# Patient Record
Sex: FEMALE | Race: WHITE | NOT HISPANIC OR LATINO | Employment: OTHER | URBAN - METROPOLITAN AREA
[De-identification: names, ages, dates, MRNs, and addresses within clinical notes are randomized per-mention and may not be internally consistent; named-entity substitution may affect disease eponyms.]

---

## 2017-04-26 ENCOUNTER — HOSPITAL ENCOUNTER (EMERGENCY)
Facility: HOSPITAL | Age: 37
Discharge: HOME | End: 2017-04-26
Attending: EMERGENCY MEDICINE

## 2017-04-26 VITALS
RESPIRATION RATE: 19 BRPM | OXYGEN SATURATION: 97 % | HEART RATE: 67 BPM | SYSTOLIC BLOOD PRESSURE: 120 MMHG | WEIGHT: 165 LBS | DIASTOLIC BLOOD PRESSURE: 71 MMHG | TEMPERATURE: 98.3 F

## 2017-04-26 DIAGNOSIS — N89.8 VAGINAL DISCHARGE: Primary | ICD-10-CM

## 2017-04-26 DIAGNOSIS — R30.0 DYSURIA: ICD-10-CM

## 2017-04-26 LAB
BACTERIA UR QL AUTO: ABNORMAL /HPF
BILIRUB UR QL STRIP: NEGATIVE
CHLAMYDIA DNA CVX QL NAA+PROBE: NORMAL
CLARITY UR: CLEAR
COLOR UR: YELLOW
COLOR, POC: NORMAL
GLUCOSE UR STRIP-MCNC: NEGATIVE MG/DL
HGB UR QL STRIP.AUTO: NEGATIVE
HYALINE CASTS #/AREA URNS LPF: ABNORMAL /LPF
KETONES UR STRIP-MCNC: NEGATIVE MG/DL
LEUKOCYTE ESTERASE UR QL STRIP: ABNORMAL
N GONORRHOEA DNA GENITAL QL NAA+PROBE: NORMAL
NITRITE UR QL STRIP: NEGATIVE
NON-SQ EPI CELLS URNS QL MICRO: ABNORMAL /HPF
PH UR STRIP.AUTO: 8.5 [PH] (ref 4.5–8)
PROT UR STRIP-MCNC: ABNORMAL MG/DL
RBC #/AREA URNS AUTO: ABNORMAL /HPF
SP GR UR STRIP.AUTO: 1.01 (ref 1–1.03)
UROBILINOGEN UR QL STRIP.AUTO: 0.2 E.U./DL
WBC #/AREA URNS AUTO: ABNORMAL /HPF

## 2017-04-26 PROCEDURE — 81001 URINALYSIS AUTO W/SCOPE: CPT

## 2017-04-26 PROCEDURE — 87186 SC STD MICRODIL/AGAR DIL: CPT

## 2017-04-26 PROCEDURE — 81025 URINE PREGNANCY TEST: CPT | Performed by: EMERGENCY MEDICINE

## 2017-04-26 PROCEDURE — 81002 URINALYSIS NONAUTO W/O SCOPE: CPT | Performed by: EMERGENCY MEDICINE

## 2017-04-26 PROCEDURE — 87491 CHLMYD TRACH DNA AMP PROBE: CPT | Performed by: EMERGENCY MEDICINE

## 2017-04-26 PROCEDURE — 96372 THER/PROPH/DIAG INJ SC/IM: CPT

## 2017-04-26 PROCEDURE — 87086 URINE CULTURE/COLONY COUNT: CPT

## 2017-04-26 PROCEDURE — 99283 EMERGENCY DEPT VISIT LOW MDM: CPT

## 2017-04-26 PROCEDURE — 87591 N.GONORRHOEAE DNA AMP PROB: CPT | Performed by: EMERGENCY MEDICINE

## 2017-04-26 PROCEDURE — 87147 CULTURE TYPE IMMUNOLOGIC: CPT

## 2017-04-26 RX ORDER — AZITHROMYCIN 250 MG/1
1000 TABLET, FILM COATED ORAL ONCE
Status: COMPLETED | OUTPATIENT
Start: 2017-04-26 | End: 2017-04-26

## 2017-04-26 RX ORDER — METRONIDAZOLE 500 MG/1
500 TABLET ORAL 3 TIMES DAILY
Qty: 21 TABLET | Refills: 0 | Status: SHIPPED | OUTPATIENT
Start: 2017-04-26 | End: 2017-05-03

## 2017-04-26 RX ORDER — FLUCONAZOLE 150 MG/1
150 TABLET ORAL DAILY
Qty: 1 TABLET | Refills: 0 | Status: SHIPPED | OUTPATIENT
Start: 2017-04-26 | End: 2017-04-27

## 2017-04-26 RX ADMIN — AZITHROMYCIN 1000 MG: 250 TABLET, FILM COATED ORAL at 09:20

## 2017-04-26 RX ADMIN — WATER 250 MG: 1 INJECTION INTRAMUSCULAR; INTRAVENOUS; SUBCUTANEOUS at 09:20

## 2017-04-28 LAB
BACTERIA UR CULT: NORMAL
BACTERIA UR CULT: NORMAL

## 2018-01-10 NOTE — RESULT NOTES
Verified Results  TEST IN QUESTION - CYTOLOGY [id number] 12:00AM Elina Hudson     Test Name Result Flag Reference   STATUS FINAL     CONTAINER TYPE: THIN PREP     QUESTION/PROBLEM NON QUEST REQ     FINAL RESOLUTION PROCESS [id number]

## 2018-01-15 NOTE — RESULT NOTES
Message   can you please let pt know that her urine culture grew bacteria, however I would like to switch her Abx to bactrim as Notrofurantoin, the Abx she was started on was not mentioned in the sensitivities      thank you     Verified Results  (1) GENITAL COMPREHENSIVE CULTURE [id number] 12:00AM Tano Kumar     Test Name Result Flag Reference   CULTURE, GENITAL      CULTURE, GENITAL         MICRO NUMBER:      [id number]    TEST STATUS:       FINAL    SPECIMEN SOURCE:   NOT GIVEN    SPECIMEN QUALITY:  ADEQUATE    RESULT:            Heavy growth of Escherichia coli                            E coli                            ----------------                            INT   GALILEO     AMOX/CLAVULANATE       S     <=2 0     AMPICILLIN             S     <=2 0     AMP/SULBACTAM          S     <=2 0     CEFAZOLIN              NR    <=4 0     CEFEPIME               S     <=1 0     CEFTRIAXONE            S     <=1 0     CIPROFLOXACIN          S     <=0 25     GENTAMICIN             S     <=1 0     IMIPENEM               S     <=0 25     LEVOFLOXACIN           S     <=0 12     PIP/TAZOBACTAM         S     <=4 0     TOBRAMYCIN             S     <=1 0     TRIMETHOPRIM/SULFA     S     <=20 0  S=Susceptible  I=Intermediate  R=Resistant  * = Not Tested  NR = Not Reported  **NN = See Therapy Comments     (1) URINALYSIS w URINE C/S REFLEX (will reflex a microscopy if leukocytes, occult blood, or nitrites are not within normal limits) [id number] 12:00AM Elina Hudson     Test Name Result Flag Reference   COLOR YELLOW  YELLOW   APPEARANCE CLEAR  CLEAR   SPECIFIC GRAVITY 1 015  1 001-1 035   PH 7 0  5 0-8 0   GLUCOSE NEGATIVE  NEGATIVE   BILIRUBIN NEGATIVE  NEGATIVE   RBC 0-2 /HPF  < OR = 2   SQUAMOUS EPITHELIAL CELLS 0-5 /HPF  < OR = 5   BACTERIA MANY /HPF A NONE SEEN   HYALINE CAST NONE SEEN /LPF  NONE SEEN   REFLEXIVE URINE CULTURE      CULTURE INDICATED - RESULTS TO FOLLOW   KETONES NEGATIVE  NEGATIVE   OCCULT BLOOD 2+ A NEGATIVE   PROTEIN 1+ A NEGATIVE   NITRITE POSITIVE A NEGATIVE   LEUKOCYTE ESTERASE 3+ A NEGATIVE   WBC > OR = 60 /HPF A < OR = 5     REFLEXIVE URINE CULTURE [id number] 12:00AM Elina Hudson     Test Name Result Flag Reference   CULTURE, URINE, ROUTINE      CULTURE, URINE, ROUTINE         MICRO NUMBER:      [id number]    TEST STATUS:       FINAL    SPECIMEN SOURCE:   URINE    SPECIMEN QUALITY:  INADEQUATE    RESULT:            Test not performed  Improper specimen submitted  Please submit specimens for urine culture in a                       gray top urine transport tube

## 2018-01-15 NOTE — RESULT NOTES
Verified Results  Urine Dip Non-Automated- POC [id number] 07:29AM Elina Hudson     Test Name Result Flag Reference   Color Yellow     Clarity Cloudy     Leukocytes 500+++     Nitrite +     Blood ++     Bilirubin negative     Urobilinogen 0 2     Protein 15+-     Ph 6 0     Specific Gravity 1 010     Ketone negative     Glucose negative     Color Yellow     Clarity Cloudy     Leukocytes 500+++     Nitrite +     Blood ++     Bilirubin negative     Urobilinogen 0 2     Protein 15+-     Ph 6 0     Specific Gravity 1 010     Ketone negative     Glucose negative               Plan  UTI symptoms    · (1) URINALYSIS w URINE C/S REFLEX (will reflex a microscopy if leukocytes, occult  blood, or nitrites are not within normal limits); Status:Active;  Requested for:[id number];

## 2018-01-16 NOTE — RESULT NOTES
Message   Can you please let patient know that her Pap was normal?   Thank you     Verified Results  THINPREP TIS AND HPV mRNA E6/E7 [id number] 12:00AM Linamelia Elina     Test Name Result Flag Reference   CLINICAL INFORMATION:      NONE GIVEN   LMP:      MID Stuart Holts  PAP:      NONE GIVEN   PREV  BX:      NONE GIVEN   SOURCE:      ENDOCERVIX   STATEMENT OF ADEQUACY:      Satisfactory for evaluation  Endocervical/transformation zone component  present  INTERPRETATION/RESULT:      Negative for intraepithelial lesion or malignancy  COMMENT:      This Pap test has been evaluated with computer  assisted technology  CYTOTECHNOLOGIST:      DDM, CT(ASCP)  CT screening location: [address] [place] [address]   HPV mRNA E6/E7 Not Detected  Not Detected   This test was performed using the APTIMA HPV Assay ([employer] )  This assay detects E6/E7 viral messenger RNA (mRNA) from 14  high-risk HPV types (16,18,31,33,35,39,45,51,52,56,58,59,66,68)

## 2018-01-18 NOTE — MISCELLANEOUS
Message   Recorded as Task   Date: 08/09/2016 09:09 AM, Created By: Wero Lane   Task Name: Follow Up   Assigned To: Gabriella Back   Regarding Patient: Rhea Gutierrez, Status: Active   Comment:    Wero Lane - 09 Aug 2016 9:09 AM     TASK CREATED  Caller: Self; General Medical Question; [phone] (Home)  I gave patient her results of her urine and told her there was another RX called in and she has more questions about the results if you can please call her at [phone]  Candy Belcher - 09 Aug 2016 10:50 AM     TASK EDITED   Gabriella Back - 09 Aug 2016 11:57 AM     TASK REPLIED TO: Previously Assigned To Arnold Nuñez to patient regarding urine culture results  Bactrim has been called into patient's pharmacy, [facility]   She will also take Diflucan after completing anabiotic as she does get recent infections          Signatures   Electronically signed by : Dl Vang MD; Aug  9 2016 11:57AM EST                       (Author)

## 2018-01-18 NOTE — MISCELLANEOUS
Message   Recorded as Task   Date: 08/04/2016 09:39 AM, Created By: Jackie Oro   Task Name: Follow Up   Assigned To: Aleida Mendosa   Regarding Patient: Abel Victor, Status: In Progress   Comment:    Maria Del Rosario Moraes - 04 Aug 2016 9:39 AM     TASK CREATED  Caller: Self; General Medical Question; [phone] (Home)  PT CALLED IN STATING SHE IS STILL NOT FEELING BETTER AND WOULD LIKE DR HUDSON TO PLEASE CALL HER SOMETIME TODAY AT ABOVE NUMBER     Elina Hudson - 04 Aug 2016 11:58 AM     TASK REPLIED TO: Previously Assigned To Elina Hudson LM on pt's VM   Elina Hudson - 04 Aug 2016 11:58 AM     TASK IN PROGRESS   Elina Hudson - 04 Aug 2016 1:07 PM     TASK REPLIED TO: Previously Assigned To Aleida Mendosa  spoke to pt, she will come in on august 5th at 7 30 for UTI symptoms        Signatures   Electronically signed by : Trey Alberto MD; Aug  4 2016  1:07PM EST                       (Author)

## 2018-01-18 NOTE — PROGRESS NOTES
Assessment    1  UTI symptoms (788 99) (R39 9)   2  Vaginal discharge (623 5) (N89 8)   3  Vaginal Pap smear (V76 47) (Z12 72)    Plan   UTI symptoms    · Nitrofurantoin Monohyd Macro 100 MG Oral Capsule; Take one capsule twice daily  for 5 days   · Drink at least 6 glasses of clear liquids a day ; Status:Complete;   Done: 51CUN6514   · Drink plenty of fluids ; Status:Complete;   Done: 57DGQ4735  Vaginal discharge    · (1) GENITAL COMPREHENSIVE CULTURE; Source:Endocervical; Status:Active; Requested for:58Hxc9915;    · 1 - Kathleen Petite DO, [facility]  Obstetrics/Gynecology Physician Referral  Consult  Status: Hold  For - Scheduling  Requested for: 03VKC9723  Care Summary provided  : Yes   ·  [address] MD, Ashanti Dockery Obstetrics/Gynecology Physician Referral  Consult  Status:  Hold For - Scheduling  Requested for: 14PEK8326  Care Summary provided  : Yes  Vaginal Pap smear    · (1) THIN PREP PAP WITH IMAGING; Status:Active; Requested for:07Epj7396;   Maturation index required? : No  : mid July  HPV? : Regardless of Interpretation    Urine Dip Non-Automated- POC; Status:Resulted - Requires Verification,Retrospective By Protocol Authorization;   Done: 05OHR4201 12:00AM  Due:87Ser2080; Last Updated By:Lauryn Gonsalves; 2016 8:22:37 AM;Ordered; For:UTI symptoms; Ordered By:Elina Hudson;   (1) URINALYSIS w URINE C/S REFLEX (will reflex a microscopy if leukocytes, occult blood, or nitrites are not within normal limits); Status:Active - Retrospective By Protocol Authorization; Requested for:16Ssq7343;   Perform:[facility]; [id number]; Last Updated By:Lauryn Gonsalves; 2016 8:22:40 AM;Ordered; For:UTI symptoms; Ordered By:Elina Hudson; Discussion/Summary    50-year-old female presents with a four-day history of urinary urgency, frequency, lower abdominal pressure  Denies fevers, chills, flank pain, blood in the urine, constipation or change in bowel habits   Patient does admit that she does have a vaginal discharge that is noted to be white and thick  Denies any odor to the discharge  POC urine dip positive for leukocytes and microscopic blood  Will send urine for urinalysis and reflex of culture  Will start patient on nitrofurantoin  Re  vaginal discharge, will send Rx for Diflucan and send gentle culture  Routine pap and pelvic Performed today with co-testing for hpv and cytology  Will pt will results  I have also provided patient with referral to gynecology as she has had prior abnormal Paps and I did see some erythema noted at the 12:00 area of the cervix during exam   Patient is agreeable with this   care guide for urinary tract infection provided  return parameters discussed  The patient was counseled regarding diagnostic results, instructions for management, risk factor reductions, prognosis, patient and family education, impressions, risks and benefits of treatment options, importance of compliance with treatment  Possible side effects of new medications were reviewed with the patient/guardian today  The treatment plan was reviewed with the patient/guardian  The patient/guardian understands and agrees with the treatment plan      Chief Complaint  Patient is here c/o urinary frequency, lower abdominal pain upon emptying her bladder x's 4+ days  All medications were reviewed and updated with the patient  History of Present Illness  HPI: 50-year-old female presents with a four-day history of urinary urgency, frequency, lower abdominal pressure  Denies fevers, chills, flank pain, blood in the urine, constipation or change in bowel habits  Patient does admit that she does have a vaginal discharge that is noted to be white and thick  Denies any odor to the discharge  Pt states she had sexual intercourse on Sunday and symptoms started on Monday   Last pelvic exam done by [facility]  Has a history of HPV and has had colposcopies   Vaginitis (Brief):  Symptoms:  vaginal discharge, but no vaginal itching and no vaginal odor  Associated symptoms:  dysuria, urinary frequency and urinary urgency  Review of Systems    Constitutional: no fever and no chills  Gastrointestinal: no abdominal pain and no constipation  Genitourinary: dysuria and vaginal discharge, but as noted in HPI  Integumentary: no itching  Active Problems    1  Bipolar disorder (296 80) (F31 9)   2  Cold sore (054 9) (B00 1)   3  Fatigue (780 79) (R53 83)   4  Genital herpes simplex (054 10) (A60 00)   5  Left ankle injury (959 7) (M98 707H)   6  Right hip pain (719 45) (M25 551)   7  Screening for lipoid disorders (V77 91) (Z13 220)    Past Medical History    1  History of Bipolar disorder (296 80) (F31 9)    Family History  Father    1  Family history of hyperlipidemia (V18 19) (Z83 49)  Maternal Grandfather    2  Family history of Diabetes Mellitus (V18 0)  Paternal Grandfather    3  FH: heart attack (V17 3) (Z82 49)   4  Family history of Stroke Syndrome (V17 1)  Maternal Aunt    5  Family history of malignant neoplasm of breast (V16 3) (Z80 3)  Family History    6  Family history of Reported Previous Mental Illness    Social History    · Being A Social Drinker   · Caffeine use (V49 89) (F15 90)   · tea   · Former smoker (G67 35) (S46 437)   · social smoker, quit 2009    Surgical History    1  History of Tubal Ligation    Current Meds   1  Curcumax Pro Oral Tablet; curcumin; Therapy: 80ZLW8399 to Recorded   2  Probiotic Oral Capsule; Therapy: 68GLX7273 to Recorded   3  ValACYclovir HCl - 500 MG Oral Tablet; TAKE 1 TABLET TWICE DAILY; Therapy: 87EOR3223 to (Evaluate:89Egh5393) Recorded    Allergies    1  Penicillin V Potassium TABS   2   Penicillins    Vitals   Recorded: 21OLY1536 21:60BC   Systolic 696   Diastolic 78   Heart Rate 84   Respiration 14   Temperature 98 5 F   Height 5 ft 1 5 in   Weight 154 lb 4 00 oz   BMI Calculated 28 67   BSA Calculated 1 7     Physical Exam    Constitutional   General appearance: No acute distress, well appearing and well nourished  Ears, Nose, Mouth, and Throat   Lips, teeth, and gums: Normal, good dentition  Oropharynx: Normal with no erythema, edema, exudate or lesions  Neck   Neck: Supple, symmetric, trachea midline, no masses  Thyroid: Normal, no thyromegaly  Pulmonary   Respiratory effort: No increased work of breathing or signs of respiratory distress  Auscultation of lungs: Clear to auscultation  Cardiovascular   Auscultation of heart: Normal rate and rhythm, normal S1 and S2, no murmurs  Chest   Breasts: Normal, no dimpling or skin changes appreciated  Palpation of breasts and axillae: Normal, no masses palpated  Abdomen mild TTP of suprapubic region  No CVA tenderness noted  Genitourinary mild erythema noted  White thick discharge noted  NO odor noted  Urethra: Normal, no discharge  Bladder: Not distended, no tenderness  Cervix: Normal, no lesions  mild erythema noted at 12 o'clock region at cervix  Uterus: Normal size, no tenderness, no masses  Adnexa/Parametria: Normal, no masses or tenderness  Lymphatic   Palpation of lymph nodes in neck: No lymphadenopathy  Neurologic   Cranial nerves: Cranial nerves II-XII intact  Psychiatric   Mood and affect: Normal        Results/Data  eCalcs - Health Calculators 38EQT1200 07:25AM User, [employer]     Test Name Result Flag Reference   SBIRT Screen - Tobacco Screening Result Negative       PHQ-2 Adult Depression Screening 45Rku8785 07:25AM User, [facility]     Test Name Result Flag Reference   PHQ-2 Adult Depression Score 0     Over the last two weeks, how often have you been bothered by any of the following problems?   Little interest or pleasure in doing things: Not at all - 0  Feeling down, depressed, or hopeless: Not at all - 0   PHQ-2 Adult Depression Screening Negative         Signatures   Electronically signed by : Anabelle Chu MD; Aug  6 2016  5:04PM EST                       (Author)

## 2018-01-18 NOTE — RESULT NOTES
Message   Can you please let pt know that her bw was overall within a normal range  Her total chol was 174, good/hld was 48, bad/LDL was 110  Her blood sugar, liver, kidneys, electrolytes, tyroid were all within a normal range  Her vit d was low at 21 and I recommend she take 2000 iu daily   Thank you     Verified Results  (Q) LIPID PANEL WITH REFLEX TO DIRECT LDL [id number] 08:37AM Elina Hudson     Test Name Result Flag Reference   CHOLESTEROL, TOTAL 174 mg/dL  125-200   HDL CHOLESTEROL 48 mg/dL  > OR = 46   TRIGLICERIDES 82 mg/dL  <840   LDL-CHOLESTEROL 110 mg/dL (calc)  <130   Desirable range <100 mg/dL for patients with CHD or  diabetes and <70 mg/dL for diabetic patients with  known heart disease  CHOL/HDLC RATIO 3 6 (calc)  < OR = 5 0   NON HDL CHOLESTEROL 126 mg/dL (calc)     Target for non-HDL cholesterol is 30 mg/dL higher than   LDL cholesterol target  (1) COMPREHENSIVE METABOLIC PANEL 32SOU2784 97:99IJ Elina Hudson     Test Name Result Flag Reference   GLUCOSE 99 mg/dL  65-99   Fasting reference interval   UREA NITROGEN (BUN) 10 mg/dL  7-25   CREATININE 0 63 mg/dL  0 50-1 10   eGFR NON-AFR   AMERICAN 115 mL/min/1 73m2  > OR = 60   eGFR AFRICAN AMERICAN 134 mL/min/1 73m2  > OR = 60   BUN/CREATININE RATIO   2-37   NOT APPLICABLE (calc)   SODIUM 140 mmol/L  135-146   POTASSIUM 4 2 mmol/L  3 5-5 3   CHLORIDE 107 mmol/L     CARBON DIOXIDE 22 mmol/L  19-30   CALCIUM 8 9 mg/dL  8 6-10 2   PROTEIN, TOTAL 6 8 g/dL  6 1-8 1   ALBUMIN 4 2 g/dL  3 6-5 1   GLOBULIN 2 6 g/dL (calc)  1 9-3 7   ALBUMIN/GLOBULIN RATIO 1 6 (calc)  1 0-2 5   BILIRUBIN, TOTAL 0 2 mg/dL  0 2-1 2   ALKALINE PHOSPHATASE 58 U/L     AST 17 U/L  10-30   ALT 23 U/L  6-29     (1) CBC/PLT/DIFF 96BPX2745 08:37AM Darnell Hudsnona     Test Name Result Flag Reference   WHITE BLOOD CELL COUNT 6 7 Thousand/uL  3 8-10 8   RED BLOOD CELL COUNT 4 46 Million/uL  3 80-5 10   HEMOGLOBIN 13 4 g/dL  11 7-15 5   HEMATOCRIT 41 5 %  35 0-45 0 MCV 92 9 fL  80 0-100 0   MCH 30 1 pg  27 0-33 0   MCHC 32 4 g/dL  32 0-36 0   RDW 14 3 %  11 0-15 0   PLATELET COUNT 808 Thousand/uL  140-400   MPV 7 7 fL  7 5-11 5   ABSOLUTE NEUTROPHILS 4348 cells/uL  2952-8854   ABSOLUTE LYMPHOCYTES 1903 cells/uL  850-3900   ABSOLUTE MONOCYTES 362 cells/uL  200-950   ABSOLUTE EOSINOPHILS 67 cells/uL     ABSOLUTE BASOPHILS 20 cells/uL  0-200   NEUTROPHILS 64 9 %     LYMPHOCYTES 28 4 %     MONOCYTES 5 4 %     EOSINOPHILS 1 0 %     BASOPHILS 0 3 %       *(Q) VITAMIN D, 25-HYDROXY, LC/MS/MS [id number] 08:37AM Elina Hudson     Test Name Result Flag Reference   VITAMIN D, 25-OH, TOTAL 23 ng/mL L    Vitamin D Status         25-OH Vitamin D:     Deficiency:                    <20 ng/mL  Insufficiency:             20 - 29 ng/mL  Optimal:                 > or = 30 ng/mL     For 25-OH Vitamin D testing on patients on   D2-supplementation and patients for whom quantitation   of D2 and D3 fractions is required, the QuestAssureD(TM)  25-OH VIT D, (D2,D3), LC/MS/MS is recommended: order   code 66324 (patients >2yrs)  For more information on this test, go to:  [email or web address]/[email or web address]  (This link is being provided for   informational/educational purposes only )     (Q) TSH, 3RD GENERATION W/REFLEX TO FT4 [id number] 08:37AM Elina Hudson   REPORT COMMENT:  FASTING:YES     Test Name Result Flag Reference   TSH W/REFLEX TO FT4 1 46 mIU/L     Reference Range                         > or = 20 Years  0 40-4 50                              Pregnancy Ranges            First trimester    0 26-2 66            Second trimester   0 55-2 73            Third trimester    0 43-2 91       Signatures   Electronically signed by : Gudelia Shabazz MD; Mar  2 2016  9:07AM EST                       (Author)

## 2019-03-26 ENCOUNTER — TRANSCRIBE ORDERS (OUTPATIENT)
Dept: ADMINISTRATIVE | Facility: HOSPITAL | Age: 39
End: 2019-03-26

## 2019-03-26 DIAGNOSIS — Z12.31 ENCOUNTER FOR SCREENING MAMMOGRAM FOR MALIGNANT NEOPLASM OF BREAST: Primary | ICD-10-CM

## 2019-04-01 ENCOUNTER — HOSPITAL ENCOUNTER (OUTPATIENT)
Dept: RADIOLOGY | Facility: IMAGING CENTER | Age: 39
Discharge: HOME | End: 2019-04-01
Payer: MEDICARE

## 2019-04-01 VITALS — HEIGHT: 63 IN | BODY MASS INDEX: 28.35 KG/M2 | WEIGHT: 160 LBS

## 2019-04-01 DIAGNOSIS — Z12.31 ENCOUNTER FOR SCREENING MAMMOGRAM FOR MALIGNANT NEOPLASM OF BREAST: ICD-10-CM

## 2019-04-01 PROCEDURE — 77067 SCR MAMMO BI INCL CAD: CPT
